# Patient Record
Sex: FEMALE | Race: WHITE | NOT HISPANIC OR LATINO | URBAN - METROPOLITAN AREA
[De-identification: names, ages, dates, MRNs, and addresses within clinical notes are randomized per-mention and may not be internally consistent; named-entity substitution may affect disease eponyms.]

---

## 2020-01-13 ENCOUNTER — OFFICE (OUTPATIENT)
Dept: URBAN - METROPOLITAN AREA CLINIC 11 | Facility: CLINIC | Age: 25
End: 2020-01-13

## 2020-01-13 VITALS
WEIGHT: 129 LBS | HEIGHT: 67 IN | SYSTOLIC BLOOD PRESSURE: 128 MMHG | HEART RATE: 115 BPM | DIASTOLIC BLOOD PRESSURE: 78 MMHG

## 2020-01-13 DIAGNOSIS — R19.5 OTHER FECAL ABNORMALITIES: ICD-10-CM

## 2020-01-13 PROCEDURE — 99203 OFFICE O/P NEW LOW 30 MIN: CPT | Performed by: INTERNAL MEDICINE

## 2020-01-13 NOTE — SERVICEHPINOTES
NP NOTE: She had a change in bowel habits that started a year and a half ago. She started seeing "fibers" in her stool. She submitted a stool specimen to her primary care physician at this time. Results were negative. They performed blood work to check for Crohn's, which was negative. She went to the bathroom over [place] break and had a bowel movement that had a thread looking piece intertwined in the stool. The material is brown in color. She states she has a bowel movement 4 times per day and it is soft and regular in consistency. She does state that she has "gas pains" every couple of days that she says happens when she eats and when she has to go to the bathroom. The pain is cramping and sharp in BLQ.MD NOTE:  Began noticing small "threads or worms" in her stools 18-24 months ago.  Object is one inch long and is seen intermittently.  Seen by LMD in [facility] and had a stool sample obtained.  This object is seen in 10% of stools and was not seen in the sample she took to her LMD.  She can not associate stool appearance with any particular food.  Stool frequency may have increased from 2 BMs a day to 3-4 BMs a day.  Stools are formed.  Random abdominal cramping that she attributes to "gas".  Cramping most common after eating and resolves with defecation.  Takes a daily MVI.  No fiber supplementation.  Weight is stable.  Object does not move.  She lives in [facility] and is returning to [facility] today.  We discussed option of establishing GI f/u in [facility] if we can not get a stool sample her.  FONT style="BACKGROUND-COLOR: #ffffcc" visited="true"BR/FONT

## 2020-01-13 NOTE — SERVICENOTES
Patient seen independently and discussed with Ofelia LANDAVERDE.  Physical exam performed by and entered by MD.  Impression and plan developed and entered by MD